# Patient Record
Sex: FEMALE | Race: OTHER | URBAN - NONMETROPOLITAN AREA
[De-identification: names, ages, dates, MRNs, and addresses within clinical notes are randomized per-mention and may not be internally consistent; named-entity substitution may affect disease eponyms.]

---

## 2017-03-16 NOTE — PATIENT DISCUSSION
1.  Pseudophakia OU - IOLs stable. Monitor. 2. Asteroid Hyalosis OU - F/U with Dr. Alvarez Running as before. 3. Refractive error Annual Good ocular health documented. Discussed options of glasses contacts or refractive surgery. Discussed importance of annual eye exams. 4.  Return for an appointment in 12 months for comprehensive exam. with Dr. Chelsi Mittal. 5.  DM II without sign of Diabetic Retinopathy OU:  Discussed the pathophysiology of diabetes and its effect on the eye. Stressed the importance of regular followup and good control of BS BP and Lipids to avoid future complications.

## 2018-03-15 NOTE — PATIENT DISCUSSION
1.  Pseudophakia OU - IOLs stable. Monitor. 2.  DM II without sign of Diabetic Retinopathy OU:  Discussed the pathophysiology of diabetes and its effect on the eye. Stressed the importance of regular followup and good control of BS BP and Lipids to avoid future complications. 3.   Asteroid Hyalosis OU -

## 2019-03-15 NOTE — PATIENT DISCUSSION
1. DM II without sign of Diabetic Retinopathy OU:  Discussed the pathophysiology of diabetes and its effect on the eye. Stressed the importance of regular followup and good control of BS BP and Lipids to avoid future complications. 2. Asteroid Hyalosis OU - OS/OD. Eval with Dr. Brittny Lindsey possible removal.3. Pseudophakia OU - IOLs stable. Monitor.

## 2020-03-18 NOTE — PATIENT DISCUSSION
1.  Asteroid Hyalosis OU - monitor2. Pseudophakia OU - IOLs stable. Monitor for changes in vision. 3.  DM II without sign of Diabetic Retinopathy OU:  Discussed the pathophysiology of diabetes and its effect on the eye. Stressed the importance of regular followup and good control of BS BP and Lipids to avoid future complications.

## 2022-04-25 ENCOUNTER — EMERGENCY VISIT (OUTPATIENT)
Dept: URBAN - NONMETROPOLITAN AREA CLINIC 1 | Facility: CLINIC | Age: 67
End: 2022-04-25

## 2022-04-25 DIAGNOSIS — H11.443: ICD-10-CM

## 2022-04-25 PROCEDURE — 99203 OFFICE O/P NEW LOW 30 MIN: CPT

## 2022-04-25 ASSESSMENT — VISUAL ACUITY
OS_SC: 20/29
OD_SC: 20/29

## 2022-04-25 ASSESSMENT — TONOMETRY
OS_IOP_MMHG: 18
OD_IOP_MMHG: 18

## 2022-04-25 NOTE — PATIENT DISCUSSION
Discussed risk and benefits of surgical treatment vs. medical management. Recommend Eval with Dr. Richard Juárez, Pt understands and agrees with plan at this time.